# Patient Record
Sex: FEMALE | Race: WHITE | Employment: FULL TIME | URBAN - METROPOLITAN AREA
[De-identification: names, ages, dates, MRNs, and addresses within clinical notes are randomized per-mention and may not be internally consistent; named-entity substitution may affect disease eponyms.]

---

## 2021-03-02 ENCOUNTER — OFFICE VISIT (OUTPATIENT)
Dept: PRIMARY CARE CLINIC | Age: 46
End: 2021-03-02
Payer: COMMERCIAL

## 2021-03-02 DIAGNOSIS — Z20.822 EXPOSURE TO COVID-19 VIRUS: Primary | ICD-10-CM

## 2021-03-02 LAB — SARS-COV-2: NOT DETECTED

## 2021-03-02 PROCEDURE — 99211 OFF/OP EST MAY X REQ PHY/QHP: CPT | Performed by: NURSE PRACTITIONER

## 2021-03-02 NOTE — PROGRESS NOTES
Leslie Reyes received a viral test for COVID-19. They were educated on isolation and quarantine as appropriate. For any symptoms, they were directed to seek care from their PCP, given contact information to establish with a doctor, directed to an urgent care or the emergency room.

## 2021-03-16 RX ORDER — OXYCODONE HYDROCHLORIDE AND ACETAMINOPHEN 5; 325 MG/1; MG/1
1 TABLET ORAL EVERY 4 HOURS PRN
COMMUNITY

## 2021-03-16 NOTE — PROGRESS NOTES
Name_______________________________________Printed:____________________  Date and time of surgery____03/22/2021  0845____________________Arrival Time:____0715____________   1. The instructions given regarding when and if a patient needs to stop oral intake prior to surgery varies. Follow the specific instructions you were given                  _x__Nothing to eat or to drink after Midnight the night before.                   ____Carbo loading or ERAS instructions will be given to select patients-if you have been given those instructions -please do the following                           The evening before your surgery after dinner before midnight drink 40 ounces of gatorade. If you are diabetic use sugar free. The morning of surgery drink 40 ounces of water. This needs to be finished 3 hours prior to your surgery start time. 2. Take the following pills with a small sip of water on the morning of surgery___________________________________________________                  Do not take blood pressure medications ending in pril or sartan the christine prior to surgery or the morning of surgery_   3. Aspirin, Ibuprofen, Advil, Naproxen, Vitamin E and other Anti-inflammatory products and supplements should be stopped for 5 -7days before surgery or as directed by your physician. 4. Check with your Doctor regarding stopping Plavix, Coumadin,Eliquis, Lovenox,Effient,Pradaxa,Xarelto, Fragmin or other blood thinners and follow their instructions. 5. Do not smoke, and do not drink any alcoholic beverages 24 hours prior to surgery. This includes NA Beer. Refrain from the usage of any recreational drugs. 6. You may brush your teeth and gargle the morning of surgery. DO NOT SWALLOW WATER   7. You MUST make arrangements for a responsible adult to stay on site while you are here and take you home after your surgery. You will not be allowed to leave alone or drive yourself home.   It is strongly suggested someone stay with you the first 24 hrs. Your surgery will be cancelled if you do not have a ride home. 8. A parent/legal guardian must accompany a child scheduled for surgery and plan to stay at the hospital until the child is discharged. Please do not bring other children with you. 9. Please wear simple, loose fitting clothing to the hospital.  [place] not bring valuables (money, credit cards, checkbooks, etc.) Do not wear any makeup (including no eye makeup) or nail polish on your fingers or toes. 10. DO NOT wear any jewelry or piercings on day of surgery. All body piercing jewelry must be removed. 11. If you have ___dentures, they will be removed before going to the OR; we will provide you a container. If you wear ___contact lenses or __x_glasses, they will be removed; please bring a case for them. 12. Please see your family doctor/pediatrician for a history & physical and/or concerning medications. Bring any test results/reports from your physician's office. PCP__________________Phone___________H&P Appt. Date________             13 If you  have a Living Will and Durable Power of  for Healthcare, please bring in a copy. 15. Notify your Surgeon if you develop any illness between now and surgery  time, cough, cold, fever, sore throat, nausea, vomiting, etc.  Please notify your surgeon if you experience dizziness, shortness of breath or blurred vision between now & the time of your surgery             15. DO NOT shave your operative site 96 hours prior to surgery. For face & neck surgery, men may use an electric razor 48 hours prior to surgery. 16. Shower the night before or morning of surgery using an antibacterial soap or as you have been instructed. 17. To provide excellent care visitors will be limited to one in the room at any given time. 18.  Please bring picture ID and insurance card.              19.  Visit our web site for additional to change. All above information reviewed with patient in person or by phone. Patient verbalizes understanding. All questions and concerns addressed.                                                                                                  Patient/Rep______patient______________                                                                                                                                    PRE OP INSTRUCTIONS

## 2021-03-22 ENCOUNTER — ANESTHESIA (OUTPATIENT)
Dept: OPERATING ROOM | Age: 46
End: 2021-03-22
Payer: COMMERCIAL

## 2021-03-22 ENCOUNTER — HOSPITAL ENCOUNTER (OUTPATIENT)
Age: 46
Setting detail: OUTPATIENT SURGERY
Discharge: HOME | End: 2021-03-22
Attending: PODIATRIST | Admitting: PODIATRIST
Payer: COMMERCIAL

## 2021-03-22 ENCOUNTER — ANESTHESIA EVENT (OUTPATIENT)
Dept: OPERATING ROOM | Age: 46
End: 2021-03-22
Payer: COMMERCIAL

## 2021-03-22 VITALS — TEMPERATURE: 97.1 F | SYSTOLIC BLOOD PRESSURE: 112 MMHG | DIASTOLIC BLOOD PRESSURE: 70 MMHG | OXYGEN SATURATION: 100 %

## 2021-03-22 VITALS
TEMPERATURE: 97 F | WEIGHT: 214 LBS | HEIGHT: 68 IN | DIASTOLIC BLOOD PRESSURE: 79 MMHG | OXYGEN SATURATION: 100 % | BODY MASS INDEX: 32.43 KG/M2 | HEART RATE: 69 BPM | RESPIRATION RATE: 16 BRPM | SYSTOLIC BLOOD PRESSURE: 111 MMHG

## 2021-03-22 DIAGNOSIS — G89.18 POST-OP PAIN: Primary | ICD-10-CM

## 2021-03-22 LAB
HCG(URINE) PREGNANCY TEST: NEGATIVE
SARS-COV-2, NAAT: DETECTED
SARS-COV-2, NAAT: NOT DETECTED

## 2021-03-22 PROCEDURE — 7100000011 HC PHASE II RECOVERY - ADDTL 15 MIN: Performed by: PODIATRIST

## 2021-03-22 PROCEDURE — 7100000001 HC PACU RECOVERY - ADDTL 15 MIN: Performed by: PODIATRIST

## 2021-03-22 PROCEDURE — C1713 ANCHOR/SCREW BN/BN,TIS/BN: HCPCS | Performed by: PODIATRIST

## 2021-03-22 PROCEDURE — 3700000001 HC ADD 15 MINUTES (ANESTHESIA): Performed by: PODIATRIST

## 2021-03-22 PROCEDURE — 2500000003 HC RX 250 WO HCPCS: Performed by: NURSE ANESTHETIST, CERTIFIED REGISTERED

## 2021-03-22 PROCEDURE — 3700000000 HC ANESTHESIA ATTENDED CARE: Performed by: PODIATRIST

## 2021-03-22 PROCEDURE — 2720000010 HC SURG SUPPLY STERILE: Performed by: PODIATRIST

## 2021-03-22 PROCEDURE — 6360000002 HC RX W HCPCS: Performed by: NURSE ANESTHETIST, CERTIFIED REGISTERED

## 2021-03-22 PROCEDURE — 2500000003 HC RX 250 WO HCPCS: Performed by: PODIATRIST

## 2021-03-22 PROCEDURE — 84703 CHORIONIC GONADOTROPIN ASSAY: CPT

## 2021-03-22 PROCEDURE — 2580000003 HC RX 258: Performed by: PODIATRIST

## 2021-03-22 PROCEDURE — 2580000003 HC RX 258: Performed by: ANESTHESIOLOGY

## 2021-03-22 PROCEDURE — 7100000000 HC PACU RECOVERY - FIRST 15 MIN: Performed by: PODIATRIST

## 2021-03-22 PROCEDURE — 3600000014 HC SURGERY LEVEL 4 ADDTL 15MIN: Performed by: PODIATRIST

## 2021-03-22 PROCEDURE — 6360000002 HC RX W HCPCS: Performed by: PODIATRIST

## 2021-03-22 PROCEDURE — 2709999900 HC NON-CHARGEABLE SUPPLY: Performed by: PODIATRIST

## 2021-03-22 PROCEDURE — 7100000010 HC PHASE II RECOVERY - FIRST 15 MIN: Performed by: PODIATRIST

## 2021-03-22 PROCEDURE — 87635 SARS-COV-2 COVID-19 AMP PRB: CPT

## 2021-03-22 PROCEDURE — 3600000004 HC SURGERY LEVEL 4 BASE: Performed by: PODIATRIST

## 2021-03-22 DEVICE — IMPLANTABLE DEVICE
Type: IMPLANTABLE DEVICE | Site: FOOT | Status: FUNCTIONAL
Brand: GRAVITY

## 2021-03-22 DEVICE — TWIST-OFF SCREW
Type: IMPLANTABLE DEVICE | Status: FUNCTIONAL
Brand: FIXOS

## 2021-03-22 RX ORDER — HYDRALAZINE HYDROCHLORIDE 20 MG/ML
5 INJECTION INTRAMUSCULAR; INTRAVENOUS EVERY 10 MIN PRN
Status: DISCONTINUED | OUTPATIENT
Start: 2021-03-22 | End: 2021-03-22 | Stop reason: HOSPADM

## 2021-03-22 RX ORDER — LIDOCAINE HYDROCHLORIDE 10 MG/ML
1 INJECTION, SOLUTION EPIDURAL; INFILTRATION; INTRACAUDAL; PERINEURAL
Status: DISCONTINUED | OUTPATIENT
Start: 2021-03-22 | End: 2021-03-22 | Stop reason: HOSPADM

## 2021-03-22 RX ORDER — SODIUM CHLORIDE, SODIUM LACTATE, POTASSIUM CHLORIDE, CALCIUM CHLORIDE 600; 310; 30; 20 MG/100ML; MG/100ML; MG/100ML; MG/100ML
INJECTION, SOLUTION INTRAVENOUS CONTINUOUS
Status: DISCONTINUED | OUTPATIENT
Start: 2021-03-22 | End: 2021-03-22 | Stop reason: HOSPADM

## 2021-03-22 RX ORDER — LIDOCAINE HYDROCHLORIDE 20 MG/ML
INJECTION, SOLUTION EPIDURAL; INFILTRATION; INTRACAUDAL; PERINEURAL PRN
Status: DISCONTINUED | OUTPATIENT
Start: 2021-03-22 | End: 2021-03-22 | Stop reason: SDUPTHER

## 2021-03-22 RX ORDER — BUPIVACAINE HYDROCHLORIDE 5 MG/ML
INJECTION, SOLUTION EPIDURAL; INTRACAUDAL
Status: COMPLETED | OUTPATIENT
Start: 2021-03-22 | End: 2021-03-22

## 2021-03-22 RX ORDER — LABETALOL HYDROCHLORIDE 5 MG/ML
5 INJECTION, SOLUTION INTRAVENOUS EVERY 10 MIN PRN
Status: DISCONTINUED | OUTPATIENT
Start: 2021-03-22 | End: 2021-03-22 | Stop reason: HOSPADM

## 2021-03-22 RX ORDER — SODIUM CHLORIDE 0.9 % (FLUSH) 0.9 %
10 SYRINGE (ML) INJECTION EVERY 12 HOURS SCHEDULED
Status: DISCONTINUED | OUTPATIENT
Start: 2021-03-22 | End: 2021-03-22 | Stop reason: HOSPADM

## 2021-03-22 RX ORDER — FENTANYL CITRATE 50 UG/ML
INJECTION, SOLUTION INTRAMUSCULAR; INTRAVENOUS PRN
Status: DISCONTINUED | OUTPATIENT
Start: 2021-03-22 | End: 2021-03-22 | Stop reason: SDUPTHER

## 2021-03-22 RX ORDER — SODIUM CHLORIDE 0.9 % (FLUSH) 0.9 %
10 SYRINGE (ML) INJECTION PRN
Status: DISCONTINUED | OUTPATIENT
Start: 2021-03-22 | End: 2021-03-22 | Stop reason: HOSPADM

## 2021-03-22 RX ORDER — PROPOFOL 10 MG/ML
INJECTION, EMULSION INTRAVENOUS CONTINUOUS PRN
Status: DISCONTINUED | OUTPATIENT
Start: 2021-03-22 | End: 2021-03-22 | Stop reason: SDUPTHER

## 2021-03-22 RX ORDER — OXYCODONE HYDROCHLORIDE AND ACETAMINOPHEN 5; 325 MG/1; MG/1
1 TABLET ORAL EVERY 6 HOURS PRN
Qty: 28 TABLET | Refills: 0 | Status: SHIPPED | OUTPATIENT
Start: 2021-03-22 | End: 2021-03-29

## 2021-03-22 RX ORDER — MEPERIDINE HYDROCHLORIDE 25 MG/ML
12.5 INJECTION INTRAMUSCULAR; INTRAVENOUS; SUBCUTANEOUS EVERY 5 MIN PRN
Status: DISCONTINUED | OUTPATIENT
Start: 2021-03-22 | End: 2021-03-22 | Stop reason: HOSPADM

## 2021-03-22 RX ORDER — HYDROMORPHONE HCL 110MG/55ML
0.5 PATIENT CONTROLLED ANALGESIA SYRINGE INTRAVENOUS EVERY 5 MIN PRN
Status: DISCONTINUED | OUTPATIENT
Start: 2021-03-22 | End: 2021-03-22 | Stop reason: HOSPADM

## 2021-03-22 RX ORDER — OXYCODONE HYDROCHLORIDE AND ACETAMINOPHEN 5; 325 MG/1; MG/1
1 TABLET ORAL
Status: DISCONTINUED | OUTPATIENT
Start: 2021-03-22 | End: 2021-03-22 | Stop reason: HOSPADM

## 2021-03-22 RX ORDER — ONDANSETRON 2 MG/ML
4 INJECTION INTRAMUSCULAR; INTRAVENOUS
Status: DISCONTINUED | OUTPATIENT
Start: 2021-03-22 | End: 2021-03-22 | Stop reason: HOSPADM

## 2021-03-22 RX ORDER — PROPOFOL 10 MG/ML
INJECTION, EMULSION INTRAVENOUS PRN
Status: DISCONTINUED | OUTPATIENT
Start: 2021-03-22 | End: 2021-03-22 | Stop reason: SDUPTHER

## 2021-03-22 RX ORDER — LIDOCAINE HYDROCHLORIDE 10 MG/ML
0.5 INJECTION, SOLUTION EPIDURAL; INFILTRATION; INTRACAUDAL; PERINEURAL ONCE
Status: DISCONTINUED | OUTPATIENT
Start: 2021-03-22 | End: 2021-03-22 | Stop reason: HOSPADM

## 2021-03-22 RX ORDER — MIDAZOLAM HYDROCHLORIDE 1 MG/ML
INJECTION INTRAMUSCULAR; INTRAVENOUS PRN
Status: DISCONTINUED | OUTPATIENT
Start: 2021-03-22 | End: 2021-03-22 | Stop reason: SDUPTHER

## 2021-03-22 RX ORDER — PHENYLEPHRINE HCL IN 0.9% NACL 1 MG/10 ML
SYRINGE (ML) INTRAVENOUS PRN
Status: DISCONTINUED | OUTPATIENT
Start: 2021-03-22 | End: 2021-03-22 | Stop reason: SDUPTHER

## 2021-03-22 RX ORDER — GLYCOPYRROLATE 1 MG/5 ML
SYRINGE (ML) INTRAVENOUS PRN
Status: DISCONTINUED | OUTPATIENT
Start: 2021-03-22 | End: 2021-03-22 | Stop reason: SDUPTHER

## 2021-03-22 RX ADMIN — PROPOFOL 40 MG: 10 INJECTION, EMULSION INTRAVENOUS at 09:11

## 2021-03-22 RX ADMIN — FENTANYL CITRATE 25 MCG: 50 INJECTION, SOLUTION INTRAMUSCULAR; INTRAVENOUS at 09:13

## 2021-03-22 RX ADMIN — FENTANYL CITRATE 25 MCG: 50 INJECTION, SOLUTION INTRAMUSCULAR; INTRAVENOUS at 09:59

## 2021-03-22 RX ADMIN — LIDOCAINE HYDROCHLORIDE 20 MG: 20 INJECTION, SOLUTION EPIDURAL; INFILTRATION; INTRACAUDAL; PERINEURAL at 09:13

## 2021-03-22 RX ADMIN — FENTANYL CITRATE 25 MCG: 50 INJECTION, SOLUTION INTRAMUSCULAR; INTRAVENOUS at 09:11

## 2021-03-22 RX ADMIN — FENTANYL CITRATE 25 MCG: 50 INJECTION, SOLUTION INTRAMUSCULAR; INTRAVENOUS at 09:22

## 2021-03-22 RX ADMIN — MIDAZOLAM 2 MG: 1 INJECTION INTRAMUSCULAR; INTRAVENOUS at 09:08

## 2021-03-22 RX ADMIN — Medication 100 MCG: at 09:51

## 2021-03-22 RX ADMIN — PROPOFOL 100 MCG/KG/MIN: 10 INJECTION, EMULSION INTRAVENOUS at 09:11

## 2021-03-22 RX ADMIN — CEFAZOLIN SODIUM 2000 MG: 10 INJECTION, POWDER, FOR SOLUTION INTRAVENOUS at 09:01

## 2021-03-22 RX ADMIN — Medication 100 MCG: at 09:25

## 2021-03-22 RX ADMIN — SODIUM CHLORIDE, POTASSIUM CHLORIDE, SODIUM LACTATE AND CALCIUM CHLORIDE: 600; 310; 30; 20 INJECTION, SOLUTION INTRAVENOUS at 09:07

## 2021-03-22 RX ADMIN — SODIUM CHLORIDE, POTASSIUM CHLORIDE, SODIUM LACTATE AND CALCIUM CHLORIDE: 600; 310; 30; 20 INJECTION, SOLUTION INTRAVENOUS at 10:20

## 2021-03-22 RX ADMIN — LIDOCAINE HYDROCHLORIDE 80 MG: 20 INJECTION, SOLUTION EPIDURAL; INFILTRATION; INTRACAUDAL; PERINEURAL at 09:10

## 2021-03-22 RX ADMIN — Medication 0.2 MG: at 09:27

## 2021-03-22 RX ADMIN — SODIUM CHLORIDE, POTASSIUM CHLORIDE, SODIUM LACTATE AND CALCIUM CHLORIDE: 600; 310; 30; 20 INJECTION, SOLUTION INTRAVENOUS at 08:58

## 2021-03-22 ASSESSMENT — PULMONARY FUNCTION TESTS
PIF_VALUE: 0
PIF_VALUE: 1
PIF_VALUE: 0
PIF_VALUE: 1
PIF_VALUE: 0
PIF_VALUE: 1
PIF_VALUE: 0
PIF_VALUE: 0
PIF_VALUE: 1
PIF_VALUE: 0
PIF_VALUE: 1
PIF_VALUE: 0
PIF_VALUE: 1
PIF_VALUE: 0

## 2021-03-22 ASSESSMENT — PAIN SCALES - GENERAL: PAINLEVEL_OUTOF10: 0

## 2021-03-22 ASSESSMENT — PAIN - FUNCTIONAL ASSESSMENT: PAIN_FUNCTIONAL_ASSESSMENT: 0-10

## 2021-03-22 ASSESSMENT — LIFESTYLE VARIABLES: SMOKING_STATUS: 0

## 2021-03-22 NOTE — ANESTHESIA POSTPROCEDURE EVALUATION
Department of Anesthesiology  Postprocedure Note    Patient: Ana Godoy  MRN: [id number]  YOB: 1975  Date of evaluation: 3/22/2021  Time:  10:39 AM     Procedure Summary     Date: 03/22/21 Room / Location: [facility] OR [address] [place] [address]    Anesthesia Start: Eliudyobani Rolando Anesthesia Stop: 7697    Procedure: [address], RIGHT (Right ) Diagnosis:       (M77.41 METATARSALGIA)      (S93.144S PLANTAR PLATE TEAR, RIGHT FOOT)    Surgeons: Nell Rooney DPM Responsible Provider: Deidre Marie MD    Anesthesia Type: MAC ASA Status: 2          Anesthesia Type: MAC    Katlin Phase I:      Katlin Phase II:      Last vitals: Reviewed and per EMR flowsheets.        Anesthesia Post Evaluation    Patient location during evaluation: PACU  Patient participation: complete - patient participated  Level of consciousness: awake and alert  Airway patency: patent  Nausea & Vomiting: no vomiting and no nausea  Complications: no  Cardiovascular status: hemodynamically stable  Respiratory status: acceptable  Hydration status: stable

## 2021-03-22 NOTE — BRIEF OP NOTE
Brief Postoperative Note      Patient: Luke Falk  YOB: 1975  MRN: [id number]    Date of Procedure: 3/22/2021    Pre-Op Diagnosis: M77.41 METATARSALGIA  S93.144S PLANTAR PLATE TEAR, RIGHT FOOT    Post-Op Diagnosis: Same       Procedure(s):  PLANTAR PLATE REPAIR AND WEIL OSTEOTOMY, RIGHT    Surgeon(s):  Ashok Baldwin DPM    Assistant:  Surgical Assistant: Shea Salvador    Anesthesia: Monitor Anesthesia Care    Estimated Blood Loss (mL): Minimal    Complications: None    Specimens:   * No specimens in log *    Implants:  Implant Name Type Inv. Item Serial No.  Lot No. LRB No. Used Action   SCREW BNE DIA3MM PLNTR GRAV  SCREW BNE DIA3MM PLNTR [facility] [email or web address] [id number] Right 1 Implanted   PLATE BNE [id number] CRAN SM GRID MESH RESORB X  PLATE BNE [id number] CRAN SM GRID MESH RESORB X  ANNABEL [employer]IMENEZ [facility]  Right 1 Implanted         Drains: * No LDAs found *    Findings: nearly full tear of plantar plate noted.      Electronically signed by Jennifer Rivas DPM on 3/22/2021 at 10:31 AM

## 2021-03-22 NOTE — PROGRESS NOTES
Patient result positive for rapid covid, patient denies any symptoms, requested repeat test & Dr Alejandro Aguilar agreed & 2nd specimen sent.

## 2021-03-22 NOTE — H&P
[facility]   HISTORY AND PHYSICAL      I am seeing Aydee Alston at the request of Dr Carol Torres for evaluation of the patient's medical problems prior to plantar plate repair and weil osteotomy , right     3/22/2021 8:14 AM    Patient Information:  Aydee Alston is a 39 y.o. female [id number]  PCP:  Michael Gunn (Tel: [phone] )    Chief complaint:  I am having foot surgery     History of Present Illness:  Kimberly Sanchez is a 39 y.o. female who presented with foot pain. Symptom onset was gradual for a time period of several months. The severity is described as moderate. The course of her symptoms over time is recurrent. The symptoms improved with non weight bearing and worsened with weight bearing activities. The patient's symptom is associated with a toe deformity. 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 She requested a repeat covid and it was negative     REVIEW OF SYSTEMS:   Constitutional:  Negative for fever,chills or night sweats  ENT:  Negative for rhinorrhea, epistaxis, hoarseness, sore throat. Respiratory:   Negative for shortness of breath,wheezing  Cardiovascular:   Negative for  chest pain, palpitations   Gastrointestinal:  Negative for nausea, vomiting, diarrhea  Genitourinary:  Negative for polyuria, dysuria   Hematologic/Lymphatic:  Negative for  bleeding tendency, easy bruising  Musculoskeletal:  Negative for myalgias and arthralgias  Neurologic:  Negative for  confusion,dysarthria. Skin:  Negative for itching,rash  Psychiatric:  Negative for depression,anxiety, agitation. Endocrine:  Negative for polydipsia,polyuria,heat /cold intolerance.     Past Medical History:   has no past medical history on file.     Past Surgical History:   has a past surgical history that includes Colonoscopy; Cedar tooth extraction; Cervical polyp removal; Gastric bypass surgery; Abdominoplasty; Cholecystectomy; knee surgery (Left); Tubal ligation; and Tonsillectomy. Medications:  No current facility-administered medications on file prior to encounter. Current Outpatient Medications on File Prior to Encounter   Medication Sig Dispense Refill    oxyCODONE-acetaminophen (PERCOCET) 5-325 MG per tablet Take 1 tablet by mouth every 4 hours as needed for Pain.  MELOXICAM PO Take by mouth daily       Multiple Vitamin (MULTIVITAMIN ADULT PO) Take by mouth daily       VITAMIN D PO Take by mouth every 30 days       Ferrous Sulfate (IRON PO) Take by mouth daily       Probiotic Product (PROBIOTIC PO) Take by mouth daily          Allergies: Allergies   Allergen Reactions    Morphine      \"Cannot pee after receiving morphine\"        Social History:   reports that she has never smoked. She has never used smokeless tobacco. She reports previous alcohol use. She reports that she does not use drugs. Family History: Mom with HTN and hypothyroidism  Sister with htn and colon cancer     Physical Exam:  /81   Pulse 71   Temp 97.7 °F (36.5 °C) (Temporal)   Resp 16   Ht 5' 8\" (1.727 m)   Wt 214 lb (97.1 kg)   LMP 07/22/2020 Comment: ablation  SpO2 100%   BMI 32.54 kg/m²     General appearance:  Appears comfortable. Well nourished, alert and pleasant  Eyes: Sclera clear, pupils equal  ENT: Moist mucus membranes, no thrush. Trachea midline. Cardiovascular: Regular rhythm, normal S1, S2. No murmur, gallop, rub. No edema in lower extremities  Respiratory: Clear to auscultation bilaterally, no wheeze, good inspiratory effort  Gastrointestinal: Abdomen soft, non-tender, not distended, normal bowel sounds  Musculoskeletal: No cyanosis in digits, neck supple  Neurology: Cranial nerves grossly intact.  Alert and oriented in time, place and person. No speech or motor deficits  Psychiatry: Appropriate affect. Not agitated  Skin: Warm, dry, normal turgor, no rash    Labs:  CBC: No results found for: WBC, RBC, HGB, HCT, MCV, MCH, MCHC, RDW, PLT, MPV  BMP:  No results found for: NA, K, CL, CO2, BUN, CREATININE, CALCIUM, GFRAA, LABGLOM, GLUCOSE        Problem List  Foot pain  Obesity      Assessment & Recommendation:     1. Foot pain: for surgery today  2. Obesity:  S/p gastric bypass at age 34 years. She has kept off 100 lbs since surgery      Patient  Is medically optimized for surgery. Thank you for the opportunity to participate in the care of your patient.   Randolph Saavdera, BAYRON - CNP    3/22/2021 8:14 AM

## 2021-03-22 NOTE — ANESTHESIA PRE PROCEDURE
Department of Anesthesiology  Preprocedure Note       Name:  Amol Bush   Age:  39 y.o.  :  1975                                          MRN:  [id number]         Date:  3/22/2021      Surgeon: David Barajas):  Geremias Forte DPM    Procedure: Procedure(s):  PLANTAR PLATE REPAIR AND WEIL OSTEOTOMY, RIGHT    Medications prior to admission:   Prior to Admission medications    Medication Sig Start Date End Date Taking? Authorizing Provider   oxyCODONE-acetaminophen (PERCOCET) 5-325 MG per tablet Take 1 tablet by mouth every 4 hours as needed for Pain.    Yes Historical Provider, MD   MELOXICAM PO Take by mouth   Yes Historical Provider, MD   Multiple Vitamin (MULTIVITAMIN ADULT PO) Take by mouth   Yes Historical Provider, MD   VITAMIN D PO Take by mouth   Yes Historical Provider, MD   Ferrous Sulfate (IRON PO) Take by mouth   Yes Historical Provider, MD   Probiotic Product (PROBIOTIC PO) Take by mouth   Yes Historical Provider, MD       Current medications:    Current Facility-Administered Medications   Medication Dose Route Frequency Provider Last Rate Last Admin    lidocaine PF 1 % injection 0.5 mL  0.5 mL Intradermal Once Geremias Forte DPM        lactated ringers infusion   Intravenous Continuous Geremias Forte DPM        ceFAZolin (ANCEF) 2000 mg in dextrose 5 % 100 mL IVPB  2,000 mg Intravenous [facility] [address] [facility]        meperidine (DEMEROL) injection 12.5 mg  12.5 mg Intravenous Q5 Min PRN Jody Sadler MD        HYDROmorphone (DILAUDID) injection 0.5 mg  0.5 mg Intravenous Q5 Min PRN Jody Sadler MD        oxyCODONE-acetaminophen (PERCOCET) 5-325 MG per tablet 1 tablet  1 tablet Oral Once PRN Jody Sadler MD        ondansetron [facility]) injection 4 mg  4 mg Intravenous Once PRN Jody Sadler MD        labetalol (NORMODYNE;TRANDATE) injection 5 mg  5 mg Intravenous Q10 Min PRN Jody Sadler MD        hydrALAZINE (APRESOLINE) injection 5 mg  5 mg Intravenous Q10 Min PRN Mine Pandey MD           Allergies: Allergies   Allergen Reactions    Morphine      \"Cannot pee after receiving morphine\"       Problem List:  There is no problem list on file for this patient. Past Medical History:  History reviewed. No pertinent past medical history. Past Surgical History:        Procedure Laterality Date    ABDOMINOPLASTY      age 32    CERVICAL POLYP REMOVAL      age 26, age 41 another polyp and ablation    CHOLECYSTECTOMY      age 46    COLONOSCOPY      GASTRIC BYPASS SURGERY      age 34    KNEE SURGERY Left     \"Realign, clean and put a donor tendon in it\"    TONSILLECTOMY      age 7 tonsils out, a week later ate a joaquin chip after and it cut everything back open and had to come back to have it all closed back up    TUBAL LIGATION      age 38   Viviana Horn WISDOM TOOTH EXTRACTION      age 15       Social History:    Social History     Tobacco Use    Smoking status: Never Smoker    Smokeless tobacco: Never Used   Substance Use Topics    Alcohol use: Not Currently                                Counseling given: Not Answered      Vital Signs (Current):   Vitals:    03/16/21 1623   Weight: 210 lb (95.3 kg)   Height: 5' 7\" (1.702 m)                                              BP Readings from Last 3 Encounters:   No data found for BP       NPO Status:                                                                                 BMI:   Wt Readings from Last 3 Encounters:   03/16/21 210 lb (95.3 kg)     Body mass index is 32.89 kg/m². CBC: No results found for: WBC, RBC, HGB, HCT, MCV, RDW, PLT    CMP: No results found for: NA, K, CL, CO2, BUN, CREATININE, GFRAA, AGRATIO, LABGLOM, GLUCOSE, PROT, CALCIUM, BILITOT, ALKPHOS, AST, ALT    POC Tests: No results for input(s): POCGLU, POCNA, POCK, POCCL, POCBUN, POCHEMO, POCHCT in the last 72 hours.     Coags: No results found for: PROTIME, INR, APTT    HCG (If Applicable): No results found for: PREGTESTUR, PREGSERUM, HCG, HCGQUANT     ABGs: No results found for: PHART, PO2ART, RXK5LUK, IWM8EAK, BEART, M6UNXXNN     Type & Screen (If Applicable):  No results found for: LABABO, LABRH    Drug/Infectious Status (If Applicable):  No results found for: HIV, HEPCAB    COVID-19 Screening (If Applicable):   Lab Results   Component Value Date    COVID19 Not Detected 03/02/2021           Anesthesia Evaluation  Patient summary reviewed and Nursing notes reviewed no history of anesthetic complications:   Airway: Mallampati: II  TM distance: >3 FB   Neck ROM: full  Mouth opening: > = 3 FB Dental: normal exam         Pulmonary:Negative Pulmonary ROS and normal exam  breath sounds clear to auscultation      (-) COPD, asthma, sleep apnea and not a current smoker                           Cardiovascular:Negative CV ROS        (-) hypertension, past MI, CAD, CABG/stent, dysrhythmias,  angina and  CHF      Rhythm: regular  Rate: normal                    Neuro/Psych:   Negative Neuro/Psych ROS     (-) seizures, TIA and CVA           GI/Hepatic/Renal: Neg GI/Hepatic/Renal ROS       (-) GERD, liver disease and no renal disease       Endo/Other: Negative Endo/Other ROS       (-) diabetes mellitus, hypothyroidism, hyperthyroidism               Abdominal:   (+) obese,         Vascular:                                      Anesthesia Plan      MAC     ASA 2       Induction: intravenous. MIPS: Postoperative opioids intended and Prophylactic antiemetics administered. Anesthetic plan and risks discussed with patient. Plan discussed with CRNA.                   Garrett Arriola MD   3/22/2021

## 2021-03-22 NOTE — PROGRESS NOTES
Transferred to phase 2 level of care. VSS. Denies pain or discomfort. Drinking fluids. Percocet delivered to bedside. Discharge instructions reviewed and signed.

## 2021-03-22 NOTE — PROGRESS NOTES
Pt arrived to PACU from OR in Stable condition and is RASS 0 (Alert and Calm) . Respirations are Regular Pattern; RR 8-20 = 20 on room air. Skin warm and dry. Abd is  soft. Pain: denies at this time. Right foot surgical site(s) intact with dressing= clean, dry and intact. Pt had pin on 2nd toe removed by Dr Bettye Hernandez. Small amt of blood noted. Area cleansed with NS and bandaid and neosporin applied. Pt to be heel weight bearing in boot and crutches as needed. Will continue to monitor for safety and comfort. S/P: PLANTAR PLATE REPAIR AND WEIL OSTEOTOMY, RIGHT , with Dr. Bettye Hernandez at [facility].

## 2021-03-30 NOTE — OP NOTE
[address]                     [facility], [address] [place] [address]                                OPERATIVE REPORT    PATIENT NAME: Olivia Simental                    :        1975  MED REC NO:   [id number]                          ROOM:  ACCOUNT NO:   [de-identified]                           ADMIT DATE: 2021  PROVIDER:     Peggy Cartagena DPM    DATE OF PROCEDURE:  2021    PREOPERATIVE DIAGNOSES:  1. Rupture of plantar plate, right second MPJ.  2.  Metatarsalgia, right foot. POSTOPERATIVE DIAGNOSES:  1. Rupture of plantar plate, right second MPJ.  2.  Metatarsalgia, right foot. OPERATIONS PERFORMED:  1. Weil osteotomy, right second metatarsal.  2.  Repair of plantar plate, right second MPJ. SURGEON:  Peggy Cartagena DPM    ANESTHESIA:  MAC. HEMOSTASIS:  Ankle tourniquet set at 250 mmHg. ESTIMATED BLOOD LOSS:  Less than 20 mL. INDICATIONS:  This 47-year-old female was known to me from my office  with chronic recurrent pain to her right foot. The patient has tried  several conservative care options including immobilization,  anti-inflammatories, injections with no impact on the pain. An MRI was  taken of the right foot and showed a plantar plate rupture of the second  MPJ. The patient was seen preoperatively. Procedure and postop course  were discussed as well as alternatives, risks, and complications. No  guarantees were given. All questions were answered to the patient's  satisfaction. The patient agrees to comply and opts to proceed with the  operation. REPORT OF OPERATION:  The patient was brought into the operating room,  properly identified and placed on the operating room table in the supine  position. Following administration of IV sedation, approximately 20 mL  of 1:1 mix of 1% Xylocaine plain and 0.5% Marcaine plain was infiltrated  about the right ankle in an ankle block type fashion.   The right foot  was then scrubbed, prepped, and draped in the usual sterile and aseptic  manner. The right lower extremity was then elevated for approximately 3  minutes and a previously applied well-padded ankle tourniquet was  inflated to 250 mmHg. Attention was then directed over the dorsal  aspect of the right second MPJ where an approximately 3 cm linear  incision was made using a #15 blade. The incision was deepened in the  same plane with care taken to identify and retract all vital  neurovascular structures. All superficial bleeders were cauterized or  ligated as deemed necessary. The incision was carried down to the level  of the MPJ where a linear longitudinal capsular incision was made. The  long extensor tendons were identified and retracted from the surgical  site so as not to be damaged. Next, the soft tissues were freed from  the head of the second metatarsal using a McGlamry elevator and a  sagittal saw was then used to make an osteotomy cut from dorsal distal  to plantar proximal so as the head of the metatarsal can be shifted  proximally. The capital fragment was then held in place using a K-wire. The shift of the metatarsal head proximally allowed for adequate  visualization of the plantar plate, which was identified and noted to be  over 75% torn from the lateral aspect of the proximal phalanx. The  remaining attached plantar plate was then removed from the proximal  phalanx and any damaged portion of the distal plantar plate was then  resected. Next, using the [facility] GRAVITY Plantar Plate Repair  Implant, the plantar plate was isolated and sutures were then passed  through the plantar plate and attached both medially and laterally along  the plantar plate itself. Next, the plantar aspect of the proximal  phalanx was then roughened using a hand rasp, and then using the  guidewire two drill holes were then placed in the base of the proximal  phalanx.   The suture passers were used to bring the sutures through the  proximal phalanx and reposition the plantar plate with full contact  along the plantar aspect of the proximal phalanx. Once the toe and  metatarsal were in good position, the sutures were then tied down  tightly after the K-wire and the metatarsal head was removed. The  metatarsal head was then shifted distally and held permanently in place  with a Yoozon twist-off screw. Once the sutures were passed through  the proximal phalanx and tied down, intraoperative x-rays were taken and  good positioning was noted to the second digit. At this time, the  surgical site was then flushed with copious amounts of normal sterile  saline. Suction was found to be free of all debris. The soft tissues  and capsule were repaired using 3-0 Vicryl. The subcutaneous tissue was  repaired using 3-0 Vicryl and the skin was repaired using 4-0 nylon. A  postoperative dressing of Adaptic, 4 x 4s, Kerlix, and Coban was applied  to the right foot. Following a period of postoperative monitoring, the  patient will be discharged home after being given both oral and written  home-going instructions. She is to follow up in my office within one  week.         Jonathan Alvarez DPM    D: 03/29/2021 21:28:06       T: 03/29/2021 23:39:25     [email or web address]  Job#: [id number]     Doc#: [id number]    CC:

## (undated) DEVICE — SUTURE ETHLN SZ 4-0 L18IN NONABSORBABLE BLK L19MM PS-2 3/8 1667H

## (undated) DEVICE — SUTURE VCRL SZ 3-0 L18IN ABSRB UD PS-2 L19MM 1/2 CIR J497G

## (undated) DEVICE — TOWEL,OR,DSP,ST,BLUE,STD,4/PK,20PK/CS: Brand: MEDLINE

## (undated) DEVICE — DRESSING,GAUZE,XEROFORM,CURAD,1"X8",ST: Brand: CURAD

## (undated) DEVICE — 3M™ COBAN™ NL STERILE NON-LATEX SELF-ADHERENT WRAP, 2084S, 4 IN X 5 YD (10 CM X 4,5 M), 18 ROLLS/CASE: Brand: 3M™ COBAN™

## (undated) DEVICE — Z CONVERTED USE 2273164 BANDAGE COMPR W4INXL4 1/2YD E EC SGL LAYERED CLP CLSR ECONO

## (undated) DEVICE — HYPODERMIC SAFETY NEEDLE: Brand: MAGELLAN

## (undated) DEVICE — DRAPE CARM MINI FOR IMAG SYS INSIGHT FLROSCN

## (undated) DEVICE — ULTRABRAID 2 COBRAID 38 LENGTH SINGL: Brand: ULTRABRAID

## (undated) DEVICE — PRECISION THIN (9.0 X 0.38 X 18.5MM)

## (undated) DEVICE — SUTURE VCRL SZ 3-0 L27IN ABSRB UD L26MM SH 1/2 CIR J416H

## (undated) DEVICE — SOLUTION IV IRRIG 500ML 0.9% SODIUM CHL 2F7123

## (undated) DEVICE — GLOVE ORANGE PI 8   MSG9080

## (undated) DEVICE — GAUZE,SPONGE,4"X4",8PLY,STRL,LF,10/TRAY: Brand: MEDLINE

## (undated) DEVICE — DRESSING PETRO W3XL3IN OIL EMUL N ADH GZ KNIT IMPREG CELOS

## (undated) DEVICE — SUTURE VCRL SZ 2-0 L27IN ABSRB UD L26MM SH 1/2 CIR J417H

## (undated) DEVICE — BANDAGE,GAUZE,BULKEE II,4.5"X4.1YD,STRL: Brand: MEDLINE

## (undated) DEVICE — INTENDED FOR TISSUE SEPARATION, AND OTHER PROCEDURES THAT REQUIRE A SHARP SURGICAL BLADE TO PUNCTURE OR CUT.: Brand: BARD-PARKER ® STAINLESS STEEL BLADES

## (undated) DEVICE — PACK PROCEDURE SURG EXTREMITY MFFOP CUST

## (undated) DEVICE — SYRINGE MED 10ML LUERLOCK TIP W/O SFTY DISP

## (undated) DEVICE — GLOVE ORTHO 8   MSG9480

## (undated) DEVICE — SUTURE ETHLN SZ 3-0 L18IN NONABSORBABLE BLK PS-2 L19MM 3/8 1669H

## (undated) DEVICE — T-DRAPE,EXTREMITY,STERILE: Brand: MEDLINE

## (undated) DEVICE — Z DISCONTINUED PER MEDLINE TRAY SKIN PREP DRY W/ PREM GLV APPLICATORS GZ TWL OVERWRAP

## (undated) DEVICE — Device: Brand: GRAVITY

## (undated) DEVICE — MEDICINE CUP, GRADUATED, STER: Brand: MEDLINE

## (undated) DEVICE — SYRINGE, LUER LOCK, 10ML: Brand: MEDLINE